# Patient Record
Sex: MALE | Race: WHITE | NOT HISPANIC OR LATINO
[De-identification: names, ages, dates, MRNs, and addresses within clinical notes are randomized per-mention and may not be internally consistent; named-entity substitution may affect disease eponyms.]

---

## 2018-12-11 ENCOUNTER — TRANSCRIPTION ENCOUNTER (OUTPATIENT)
Age: 48
End: 2018-12-11

## 2019-04-01 ENCOUNTER — TRANSCRIPTION ENCOUNTER (OUTPATIENT)
Age: 49
End: 2019-04-01

## 2021-12-20 ENCOUNTER — APPOINTMENT (OUTPATIENT)
Dept: INTERNAL MEDICINE | Facility: CLINIC | Age: 51
End: 2021-12-20

## 2022-02-18 ENCOUNTER — NON-APPOINTMENT (OUTPATIENT)
Age: 52
End: 2022-02-18

## 2022-02-18 ENCOUNTER — APPOINTMENT (OUTPATIENT)
Dept: INTERNAL MEDICINE | Facility: CLINIC | Age: 52
End: 2022-02-18
Payer: COMMERCIAL

## 2022-02-18 ENCOUNTER — TRANSCRIPTION ENCOUNTER (OUTPATIENT)
Age: 52
End: 2022-02-18

## 2022-02-18 VITALS
TEMPERATURE: 98.2 F | DIASTOLIC BLOOD PRESSURE: 80 MMHG | RESPIRATION RATE: 16 BRPM | SYSTOLIC BLOOD PRESSURE: 157 MMHG | HEART RATE: 83 BPM | WEIGHT: 198 LBS | HEIGHT: 72 IN | OXYGEN SATURATION: 99 % | BODY MASS INDEX: 26.82 KG/M2

## 2022-02-18 DIAGNOSIS — Z78.9 OTHER SPECIFIED HEALTH STATUS: ICD-10-CM

## 2022-02-18 PROCEDURE — 99203 OFFICE O/P NEW LOW 30 MIN: CPT

## 2022-08-19 ENCOUNTER — APPOINTMENT (OUTPATIENT)
Dept: INTERNAL MEDICINE | Facility: CLINIC | Age: 52
End: 2022-08-19

## 2023-01-13 ENCOUNTER — APPOINTMENT (OUTPATIENT)
Dept: GASTROENTEROLOGY | Facility: CLINIC | Age: 53
End: 2023-01-13
Payer: COMMERCIAL

## 2023-01-13 VITALS
DIASTOLIC BLOOD PRESSURE: 80 MMHG | OXYGEN SATURATION: 98 % | WEIGHT: 192 LBS | TEMPERATURE: 97.7 F | SYSTOLIC BLOOD PRESSURE: 128 MMHG | HEART RATE: 84 BPM | HEIGHT: 72 IN | BODY MASS INDEX: 26.01 KG/M2

## 2023-01-13 DIAGNOSIS — Z86.79 PERSONAL HISTORY OF OTHER DISEASES OF THE CIRCULATORY SYSTEM: ICD-10-CM

## 2023-01-13 DIAGNOSIS — I10 ESSENTIAL (PRIMARY) HYPERTENSION: ICD-10-CM

## 2023-01-13 DIAGNOSIS — Z82.49 FAMILY HISTORY OF ISCHEMIC HEART DISEASE AND OTHER DISEASES OF THE CIRCULATORY SYSTEM: ICD-10-CM

## 2023-01-13 DIAGNOSIS — Z78.9 OTHER SPECIFIED HEALTH STATUS: ICD-10-CM

## 2023-01-13 DIAGNOSIS — Z83.71 ENCOUNTER FOR SCREENING FOR MALIGNANT NEOPLASM OF COLON: ICD-10-CM

## 2023-01-13 DIAGNOSIS — Z86.39 PERSONAL HISTORY OF OTHER ENDOCRINE, NUTRITIONAL AND METABOLIC DISEASE: ICD-10-CM

## 2023-01-13 DIAGNOSIS — Z83.3 FAMILY HISTORY OF DIABETES MELLITUS: ICD-10-CM

## 2023-01-13 DIAGNOSIS — K21.9 GASTRO-ESOPHAGEAL REFLUX DISEASE W/OUT ESOPHAGITIS: ICD-10-CM

## 2023-01-13 DIAGNOSIS — Z12.11 ENCOUNTER FOR SCREENING FOR MALIGNANT NEOPLASM OF COLON: ICD-10-CM

## 2023-01-13 PROCEDURE — 99203 OFFICE O/P NEW LOW 30 MIN: CPT

## 2023-01-13 NOTE — HISTORY OF PRESENT ILLNESS
[FreeTextEntry1] : I saw patient Edward Snyder in the office today.  Patient is a 52-year-old male with a history of hypertension and hyperlipidemia the patient denies a history of chest pain or shortness of breath and his appetite is generally good with no dysphagia or unexplained weight loss.  Over the years the patient has had predictable nocturnal reflux.  This usually occurs after the patient eats later and then retires.  It has been going on for a number of years.  The patient states his bowel movements are normal with no blood in the stool or on the toilet tissue and he has never had a colonoscopy.  Patient does have a family history of colon polyps.  Edward consumes 1 to 2 cups of caffeine a day rarely has ethanol and does not smoke patient does not have any nocturnal urination..

## 2023-01-13 NOTE — PHYSICAL EXAM
[Normal] : heart rate was normal and rhythm regular, normal S1 and S2, no murmurs [Bowel Sounds] : normal bowel sounds [Abdomen Tenderness] : non-tender [Abdomen Soft] : soft [] : no hepatosplenomegaly

## 2023-01-13 NOTE — REVIEW OF SYSTEMS
[Abdominal Pain] : no abdominal pain [Constipation] : no constipation [Diarrhea] : no diarrhea [Heartburn] : heartburn [Melena (black stool)] : no melena [Bloating (gassiness)] : no bloating [Negative] : Genitourinary

## 2023-01-13 NOTE — ASSESSMENT
[FreeTextEntry1] : The patient is a 52-year-old male with a history of hypertension and hyperlipidemia.  The patient is due for colorectal cancer screening due to a family history of colon polyps as well as his age.  The exam will be scheduled at his earliest convenience and if normal it should be repeated at 5-year intervals the patient also has a fairly longstanding history of predictable nocturnal reflux.  After discussion with the patient we felt it was prudent to perform an upper endoscopy at the same time to assure that he does not have any occult acid damage and determine whether he has a hiatus hernia.  Once the procedures are performed I will distribute a copy of the results.

## 2023-09-25 ENCOUNTER — NON-APPOINTMENT (OUTPATIENT)
Age: 53
End: 2023-09-25

## 2023-09-25 ENCOUNTER — APPOINTMENT (OUTPATIENT)
Dept: INTERNAL MEDICINE | Facility: CLINIC | Age: 53
End: 2023-09-25
Payer: COMMERCIAL

## 2023-09-25 VITALS
BODY MASS INDEX: 26.41 KG/M2 | HEIGHT: 72 IN | HEART RATE: 88 BPM | TEMPERATURE: 97.5 F | DIASTOLIC BLOOD PRESSURE: 86 MMHG | WEIGHT: 195 LBS | RESPIRATION RATE: 17 BRPM | SYSTOLIC BLOOD PRESSURE: 143 MMHG | OXYGEN SATURATION: 98 %

## 2023-09-25 VITALS — SYSTOLIC BLOOD PRESSURE: 136 MMHG | DIASTOLIC BLOOD PRESSURE: 80 MMHG

## 2023-09-25 DIAGNOSIS — E78.5 HYPERLIPIDEMIA, UNSPECIFIED: ICD-10-CM

## 2023-09-25 DIAGNOSIS — Z00.00 ENCOUNTER FOR GENERAL ADULT MEDICAL EXAMINATION W/OUT ABNORMAL FINDINGS: ICD-10-CM

## 2023-09-25 DIAGNOSIS — E55.9 VITAMIN D DEFICIENCY, UNSPECIFIED: ICD-10-CM

## 2023-09-25 DIAGNOSIS — I10 ESSENTIAL (PRIMARY) HYPERTENSION: ICD-10-CM

## 2023-09-25 PROCEDURE — 93000 ELECTROCARDIOGRAM COMPLETE: CPT

## 2023-09-25 PROCEDURE — 99396 PREV VISIT EST AGE 40-64: CPT | Mod: 25

## 2023-09-26 ENCOUNTER — TRANSCRIPTION ENCOUNTER (OUTPATIENT)
Age: 53
End: 2023-09-26

## 2023-09-26 LAB
25(OH)D3 SERPL-MCNC: 34 NG/ML
ALBUMIN SERPL ELPH-MCNC: 4.5 G/DL
ALP BLD-CCNC: 71 U/L
ALT SERPL-CCNC: 23 U/L
ANION GAP SERPL CALC-SCNC: 15 MMOL/L
APPEARANCE: CLEAR
AST SERPL-CCNC: 21 U/L
BILIRUB SERPL-MCNC: 1.1 MG/DL
BILIRUBIN URINE: NEGATIVE
BLOOD URINE: NEGATIVE
BUN SERPL-MCNC: 19 MG/DL
CALCIUM SERPL-MCNC: 9.5 MG/DL
CHLORIDE SERPL-SCNC: 105 MMOL/L
CHOLEST SERPL-MCNC: 187 MG/DL
CO2 SERPL-SCNC: 21 MMOL/L
COLOR: YELLOW
CREAT SERPL-MCNC: 1.18 MG/DL
EGFR: 73 ML/MIN/1.73M2
GLUCOSE QUALITATIVE U: NEGATIVE MG/DL
GLUCOSE SERPL-MCNC: 89 MG/DL
HCT VFR BLD CALC: 47.8 %
HDLC SERPL-MCNC: 54 MG/DL
HGB BLD-MCNC: 16.3 G/DL
KETONES URINE: NEGATIVE MG/DL
LDLC SERPL CALC-MCNC: 113 MG/DL
LEUKOCYTE ESTERASE URINE: NEGATIVE
MCHC RBC-ENTMCNC: 31.8 PG
MCHC RBC-ENTMCNC: 34.1 GM/DL
MCV RBC AUTO: 93.4 FL
NITRITE URINE: NEGATIVE
NONHDLC SERPL-MCNC: 133 MG/DL
PH URINE: 6
PLATELET # BLD AUTO: 207 K/UL
POTASSIUM SERPL-SCNC: 4.2 MMOL/L
PROT SERPL-MCNC: 7.1 G/DL
PROTEIN URINE: NEGATIVE MG/DL
PSA SERPL-MCNC: 1.74 NG/ML
RBC # BLD: 5.12 M/UL
RBC # FLD: 12 %
SODIUM SERPL-SCNC: 141 MMOL/L
SPECIFIC GRAVITY URINE: 1.01
TRIGL SERPL-MCNC: 111 MG/DL
TSH SERPL-ACNC: 2.11 UIU/ML
UROBILINOGEN URINE: 0.2 MG/DL
WBC # FLD AUTO: 6.29 K/UL

## 2024-01-02 ENCOUNTER — RX RENEWAL (OUTPATIENT)
Age: 54
End: 2024-01-02

## 2024-01-02 RX ORDER — SIMVASTATIN 20 MG/1
20 TABLET, FILM COATED ORAL
Qty: 90 | Refills: 1 | Status: ACTIVE | COMMUNITY
Start: 1900-01-01 | End: 1900-01-01

## 2024-01-02 RX ORDER — LOSARTAN POTASSIUM 50 MG/1
50 TABLET, FILM COATED ORAL
Qty: 90 | Refills: 1 | Status: ACTIVE | COMMUNITY
Start: 1900-01-01 | End: 1900-01-01

## 2024-02-06 ENCOUNTER — APPOINTMENT (OUTPATIENT)
Dept: GASTROENTEROLOGY | Facility: AMBULATORY MEDICAL SERVICES | Age: 54
End: 2024-02-06
Payer: COMMERCIAL

## 2024-02-06 PROCEDURE — 45385 COLONOSCOPY W/LESION REMOVAL: CPT | Mod: 33

## 2024-09-23 ENCOUNTER — APPOINTMENT (OUTPATIENT)
Dept: ORTHOPEDIC SURGERY | Facility: CLINIC | Age: 54
End: 2024-09-23
Payer: COMMERCIAL

## 2024-09-23 DIAGNOSIS — M79.10 MYALGIA, UNSPECIFIED SITE: ICD-10-CM

## 2024-09-23 DIAGNOSIS — M54.16 RADICULOPATHY, LUMBAR REGION: ICD-10-CM

## 2024-09-23 PROCEDURE — J3490M: CUSTOM

## 2024-09-23 PROCEDURE — 72110 X-RAY EXAM L-2 SPINE 4/>VWS: CPT

## 2024-09-23 PROCEDURE — 99204 OFFICE O/P NEW MOD 45 MIN: CPT

## 2024-09-23 PROCEDURE — 20552 NJX 1/MLT TRIGGER POINT 1/2: CPT

## 2024-09-23 PROCEDURE — 72170 X-RAY EXAM OF PELVIS: CPT

## 2024-09-23 RX ORDER — METHYLPREDNISOLONE 4 MG/1
4 TABLET ORAL
Qty: 1 | Refills: 0 | Status: ACTIVE | COMMUNITY
Start: 2024-09-23 | End: 1900-01-01

## 2024-09-24 NOTE — HISTORY OF PRESENT ILLNESS
[Dull/Aching] : dull/aching [Radiating] : radiating [Frequent] : frequent [Sleep] : sleep [Rest] : rest [Standing] : standing [Walking] : walking [de-identified] : 09/23/2024: 55 y/o male presents with low back pain that started after playing volleyball about 4 weeks ago. Describes pain in the back that radiates to the right leg. its in the front of the right thigh Pain severe.  Left leg is okay.  tried motrin/tylenol No PT/accupuncture/injections/mri He has been seeing a chiropractor.  PMHX: HTN, HLD nO HX OF CANCER nO LOSS OF BB CONTROL   WORKS IN retail with [employer]   X-rays today: L spine 4v - lumbar spondylosis, loss of disc hieght Pelvis - negative  [FreeTextEntry5] : NORAH SHEIKH is a 54 year old male complaining of lower back pain x 4 weeks after playing volleyball. seeing chiropractor. reports RLE radiating symptoms. tried OTC medication and heat.

## 2024-09-24 NOTE — PROCEDURE
[Trigger point 1-2 muscle groups] : trigger point 1-2 muscle groups [Right] : of the right [Lumbar paraspinal muscle] : lumbar paraspinal muscle [Pain] : pain [Alcohol] : alcohol [Betadine] : betadine [Ethyl Chloride sprayed topically] : ethyl chloride sprayed topically [___ cc    1%] : Lidocaine ~Vcc of 1%  [___ cc    0.25%] : Bupivacaine (Marcaine) ~Vcc of 0.25%  [___ cc    10mg] : Triamcinolone (Kenalog) ~Vcc of 10 mg  [Risks, benefits, alternatives discussed / Verbal consent obtained] : the risks benefits, and alternatives have been discussed, and verbal consent was obtained [FreeTextEntry3] : pain was 8 prior to the injection and 4 after

## 2024-09-24 NOTE — DISCUSSION/SUMMARY
[de-identified] : reviewed the case and the imaging with the patient  discussion of the condition and treatment options cautions discussed questions answered discussion of natural history of the condition and what the next step would be  reviewed the case and the imaging with him  indicated for PT and MRi  TPi tolerated well

## 2024-09-24 NOTE — HISTORY OF PRESENT ILLNESS
[Dull/Aching] : dull/aching [Radiating] : radiating [Frequent] : frequent [Sleep] : sleep [Rest] : rest [Standing] : standing [Walking] : walking [de-identified] : 09/23/2024: 55 y/o male presents with low back pain that started after playing volleyball about 4 weeks ago. Describes pain in the back that radiates to the right leg. its in the front of the right thigh Pain severe.  Left leg is okay.  tried motrin/tylenol No PT/accupuncture/injections/mri He has been seeing a chiropractor.  PMHX: HTN, HLD nO HX OF CANCER nO LOSS OF BB CONTROL   WORKS IN retail with [employer]   X-rays today: L spine 4v - lumbar spondylosis, loss of disc hieght Pelvis - negative  [FreeTextEntry5] : NORAH SHEIKH is a 54 year old male complaining of lower back pain x 4 weeks after playing volleyball. seeing chiropractor. reports RLE radiating symptoms. tried OTC medication and heat.

## 2024-09-24 NOTE — DISCUSSION/SUMMARY
[de-identified] : reviewed the case and the imaging with the patient  discussion of the condition and treatment options cautions discussed questions answered discussion of natural history of the condition and what the next step would be  reviewed the case and the imaging with him  indicated for PT and MRi  TPi tolerated well

## 2024-09-30 ENCOUNTER — APPOINTMENT (OUTPATIENT)
Dept: MRI IMAGING | Facility: CLINIC | Age: 54
End: 2024-09-30
Payer: COMMERCIAL

## 2024-09-30 PROCEDURE — 72148 MRI LUMBAR SPINE W/O DYE: CPT

## 2024-10-05 ENCOUNTER — APPOINTMENT (OUTPATIENT)
Dept: ORTHOPEDIC SURGERY | Facility: CLINIC | Age: 54
End: 2024-10-05
Payer: COMMERCIAL

## 2024-10-05 DIAGNOSIS — M48.062 SPINAL STENOSIS, LUMBAR REGION WITH NEUROGENIC CLAUDICATION: ICD-10-CM

## 2024-10-05 DIAGNOSIS — M51.26 OTHER INTERVERTEBRAL DISC DISPLACEMENT, LUMBAR REGION: ICD-10-CM

## 2024-10-05 DIAGNOSIS — M79.18 MYALGIA, OTHER SITE: ICD-10-CM

## 2024-10-05 DIAGNOSIS — M51.9 UNSPECIFIED THORACIC, THORACOLUMBAR AND LUMBOSACRAL INTERVERTEBRAL DISC DISORDER: ICD-10-CM

## 2024-10-05 PROCEDURE — 99215 OFFICE O/P EST HI 40 MIN: CPT

## 2024-10-05 RX ORDER — GABAPENTIN 300 MG/1
300 CAPSULE ORAL
Qty: 60 | Refills: 0 | Status: ACTIVE | COMMUNITY
Start: 2024-10-05 | End: 1900-01-01

## 2024-10-05 NOTE — HISTORY OF PRESENT ILLNESS
[Lower back] : lower back [8] : 8 [5] : 5 [Dull/Aching] : dull/aching [Radiating] : radiating [de-identified] : 09/23/2024: 53 y/o male presents with low back pain that started after playing volleyball about 4 weeks ago. Describes pain in the back that radiates to the right leg. its in the front of the right thigh Pain severe.  Left leg is okay.  tried motrin/tylenol No PT/accupuncture/injections/mri He has been seeing a chiropractor.  PMHX: HTN, HLD nO HX OF CANCER nO LOSS OF BB CONTROL   WORKS IN retail with [employer]   X-rays today: L spine 4v - lumbar spondylosis, loss of disc hieght Pelvis - negative   105/24: here for fu - plan at last was "reviewed the case and the imaging with the patient  discussion of the condition and treatment options cautions discussed questions answered discussion of natural history of the condition and what the next step would be  reviewed the case and the imaging with him  indicated for PT and MRi  TPi tolerated well"  Pain has been severe in the lower back and in the right thigh  ibuprofen not sure if it helps  cant really sleep  tried tens started with PT but had to stop   MRi L spine - stenosis L2-3  see report

## 2024-10-05 NOTE — DISCUSSION/SUMMARY
[Medication Risks Reviewed] : Medication risks reviewed [Surgical risks reviewed] : Surgical risks reviewed [de-identified] : reviewed the case and the imaging with the patient  lumbar stenosis L23  discussion of the condition and treatment options cautions discussed questions answered discussion of natural history of the condition and what the next step would be gabpaentin and referral to [facility] for lesi   I've discussed with the patient a decompression surgery    We've discussed the surgery details as well as potential for complications including but not limited to pain, scar and infection. There is also a possibility for recurrent or residual disk herniation, failure or fracture of bone, and need for future surgery. Finally, we discussed potential for injury to nerves, spinal cord or blood vessels, paralysis, blindness, need for transfusion, general anesthesia, allergic reaction, prolonged intubation, myocardial infarction, stroke, deep venous thrombosis, pulmonary embolus, and death.  The surgery may not lead to a satisfactory result.  The patient may face multiple surgeries or prolonged hospitalization.  The patient may need a fusion surgery.  These decisions are ultimately at the discretion of the surgeon in the event the decision needs to be made intraoperatively.  The patient will be intubated for the surgery.  The patient understands these things and all questions are answered to his/her satisfaction.   Spinal fluid leak is the most common complication with this surgery and may lead to hospilitilization, possibly repeat surgery including other procedures such as drain placement.   Patient has been instructed to stop all Aspirin and NSAIDs 10 days prior to surgery date. For Coumadin and other blood thinners, the patient is referred to the medical doctor   We will use neuromonitoring during the surgery in order to keep it as safe as possible.   The patient will need pre-admission testing prior to surgery.   The procedure does not come with a guarantee of success or of satisfaction on the patient's behalf.   At the surgeon's discretion he may call for assistance during the surgery or in the perioperative period

## 2024-10-28 ENCOUNTER — APPOINTMENT (OUTPATIENT)
Dept: ORTHOPEDIC SURGERY | Facility: CLINIC | Age: 54
End: 2024-10-28

## 2024-12-23 ENCOUNTER — TRANSCRIPTION ENCOUNTER (OUTPATIENT)
Age: 54
End: 2024-12-23

## 2025-01-01 ENCOUNTER — TRANSCRIPTION ENCOUNTER (OUTPATIENT)
Age: 55
End: 2025-01-01

## 2025-01-27 ENCOUNTER — APPOINTMENT (OUTPATIENT)
Dept: INTERNAL MEDICINE | Facility: CLINIC | Age: 55
End: 2025-01-27